# Patient Record
Sex: FEMALE
[De-identification: names, ages, dates, MRNs, and addresses within clinical notes are randomized per-mention and may not be internally consistent; named-entity substitution may affect disease eponyms.]

---

## 2020-03-04 PROBLEM — Z00.00 ENCOUNTER FOR PREVENTIVE HEALTH EXAMINATION: Status: ACTIVE | Noted: 2020-03-04

## 2020-04-08 ENCOUNTER — APPOINTMENT (OUTPATIENT)
Dept: OTOLARYNGOLOGY | Facility: CLINIC | Age: 70
End: 2020-04-08

## 2020-11-03 ENCOUNTER — APPOINTMENT (OUTPATIENT)
Dept: OTOLARYNGOLOGY | Facility: CLINIC | Age: 70
End: 2020-11-03
Payer: MEDICARE

## 2020-11-03 VITALS
HEART RATE: 80 BPM | DIASTOLIC BLOOD PRESSURE: 81 MMHG | TEMPERATURE: 97.8 F | SYSTOLIC BLOOD PRESSURE: 120 MMHG | HEIGHT: 68 IN | BODY MASS INDEX: 24.25 KG/M2 | WEIGHT: 160 LBS

## 2020-11-03 DIAGNOSIS — H90.3 SENSORINEURAL HEARING LOSS, BILATERAL: ICD-10-CM

## 2020-11-03 DIAGNOSIS — H61.23 IMPACTED CERUMEN, BILATERAL: ICD-10-CM

## 2020-11-03 DIAGNOSIS — H90.5 UNSPECIFIED SENSORINEURAL HEARING LOSS: ICD-10-CM

## 2020-11-03 DIAGNOSIS — Z78.9 OTHER SPECIFIED HEALTH STATUS: ICD-10-CM

## 2020-11-03 PROCEDURE — G0268 REMOVAL OF IMPACTED WAX MD: CPT

## 2020-11-03 PROCEDURE — 92557 COMPREHENSIVE HEARING TEST: CPT

## 2020-11-03 PROCEDURE — 99204 OFFICE O/P NEW MOD 45 MIN: CPT | Mod: 25

## 2020-11-03 PROCEDURE — 92567 TYMPANOMETRY: CPT

## 2020-11-03 NOTE — HISTORY OF PRESENT ILLNESS
[de-identified] : c/o clogged ears for past several mos.  Worse past 2 weeks.  No q tips.  No self treatment.  No prior ear problems.

## 2020-11-03 NOTE — ASSESSMENT
[FreeTextEntry1] : Patient with clogged ears.  Cerumen removed bilaterally but after cerumen removed patient noted to have asymmetric snhl - unknown duration.  Did not recommend steroid at this time since this is not sudden and patient was only complaining about right ear.  Recommended MRI however due to asymmetry.  R and A of steroid discussed.

## 2020-11-03 NOTE — PHYSICAL EXAM
[Midline] : trachea located in midline position [Normal] : no rashes [de-identified] : bilateral impacted cerumen  [de-identified] : after cerumen removal

## 2022-07-27 ENCOUNTER — OFFICE (OUTPATIENT)
Dept: URBAN - METROPOLITAN AREA CLINIC 12 | Facility: CLINIC | Age: 72
Setting detail: OPHTHALMOLOGY
End: 2022-07-27
Payer: MEDICARE

## 2022-07-27 DIAGNOSIS — H35.372: ICD-10-CM

## 2022-07-27 DIAGNOSIS — H35.3132: ICD-10-CM

## 2022-07-27 DIAGNOSIS — H25.13: ICD-10-CM

## 2022-07-27 PROCEDURE — 99204 OFFICE O/P NEW MOD 45 MIN: CPT | Performed by: OPHTHALMOLOGY

## 2022-07-27 PROCEDURE — 92250 FUNDUS PHOTOGRAPHY W/I&R: CPT | Performed by: OPHTHALMOLOGY

## 2022-07-27 ASSESSMENT — REFRACTION_AUTOREFRACTION
OS_AXIS: 090
OD_AXIS: 109
OS_CYLINDER: -0.75
OD_CYLINDER: -1.00
OD_SPHERE: -2.75
OS_SPHERE: -3.25

## 2022-07-27 ASSESSMENT — CONFRONTATIONAL VISUAL FIELD TEST (CVF)
OS_FINDINGS: FULL
OD_FINDINGS: FULL

## 2022-07-27 ASSESSMENT — SPHEQUIV_DERIVED
OD_SPHEQUIV: -3.25
OD_SPHEQUIV: -3.25
OS_SPHEQUIV: -3.625
OS_SPHEQUIV: -3.625

## 2022-07-27 ASSESSMENT — TONOMETRY
OD_IOP_MMHG: 11
OS_IOP_MMHG: 12

## 2022-07-27 ASSESSMENT — REFRACTION_CURRENTRX
OS_VPRISM_DIRECTION: PROGS
OS_OVR_VA: 20/
OD_CYLINDER: -0.75
OD_VPRISM_DIRECTION: PROGS
OS_ADD: +1.50
OS_ADD: +2.75
OS_VPRISM_DIRECTION: PROGS
OD_OVR_VA: 20/
OS_CYLINDER: -0.50
OS_CYLINDER: -0.50
OS_SPHERE: -4.00
OD_OVR_VA: 20/
OS_OVR_VA: 20/
OD_SPHERE: -3.50
OS_SPHERE: -3.75
OS_AXIS: 081
OD_AXIS: 094
OD_CYLINDER: -0.50
OS_AXIS: 084
OD_ADD: +2.75
OD_SPHERE: -3.50
OD_AXIS: 089
OD_VPRISM_DIRECTION: PROGS
OD_ADD: +1.50

## 2022-07-27 ASSESSMENT — REFRACTION_MANIFEST
OD_SPHERE: -2.75
OD_VA1: 20/30-2
OD_CYLINDER: -1.00
OS_SPHERE: -3.25
OS_AXIS: 090
OS_VA1: 20/40+2
OD_AXIS: 110
OU_VA: 20/30+2
OS_CYLINDER: -0.75

## 2022-07-27 ASSESSMENT — AXIALLENGTH_DERIVED
OD_AL: 24.4276
OS_AL: 24.5852
OD_AL: 24.4276
OS_AL: 24.5852

## 2022-07-27 ASSESSMENT — VISUAL ACUITY
OD_BCVA: 20/40-2
OS_BCVA: 20/40-1

## 2022-07-27 ASSESSMENT — KERATOMETRY
OD_K1POWER_DIOPTERS: 44.50
OS_AXISANGLE_DEGREES: 150
OD_K2POWER_DIOPTERS: 45.00
OS_K2POWER_DIOPTERS: 45.00
OS_K1POWER_DIOPTERS: 44.50
OD_AXISANGLE_DEGREES: 021

## 2024-05-14 ENCOUNTER — APPOINTMENT (OUTPATIENT)
Dept: OTOLARYNGOLOGY | Facility: CLINIC | Age: 74
End: 2024-05-14
Payer: MEDICARE

## 2024-05-14 VITALS
SYSTOLIC BLOOD PRESSURE: 139 MMHG | WEIGHT: 160 LBS | BODY MASS INDEX: 24.25 KG/M2 | HEART RATE: 76 BPM | DIASTOLIC BLOOD PRESSURE: 88 MMHG | HEIGHT: 68 IN

## 2024-05-14 DIAGNOSIS — H93.291 OTHER ABNORMAL AUDITORY PERCEPTIONS, RIGHT EAR: ICD-10-CM

## 2024-05-14 DIAGNOSIS — H61.21 IMPACTED CERUMEN, RIGHT EAR: ICD-10-CM

## 2024-05-14 PROCEDURE — 69210 REMOVE IMPACTED EAR WAX UNI: CPT | Mod: RT

## 2024-05-14 PROCEDURE — 99203 OFFICE O/P NEW LOW 30 MIN: CPT | Mod: 25

## 2024-05-14 RX ORDER — ACETIC ACID 20 MG/ML
2 SOLUTION AURICULAR (OTIC) 3 TIMES DAILY
Qty: 1 | Refills: 2 | Status: ACTIVE | COMMUNITY
Start: 2024-05-14 | End: 1900-01-01

## 2024-05-14 NOTE — REVIEW OF SYSTEMS
[Negative] : Heme/Lymph [Patient Intake Form Reviewed] : Patient intake form was reviewed [de-identified] : right ear blocked

## 2024-05-14 NOTE — CONSULT LETTER
[Consult Letter:] : I had the pleasure of evaluating your patient, [unfilled]. [Please see my note below.] : Please see my note below. [Consult Closing:] : Thank you very much for allowing me to participate in the care of this patient.  If you have any questions, please do not hesitate to contact me. [Sincerely,] : Sincerely, [FreeTextEntry1] : Dear Dr. STACEY MCCORD,  Thank you for your kind referral. Please refer to my enclosed office notes for [facility] . If there are any questions free to contact me. [FreeTextEntry3] : Sai Reyes MD, FACS

## 2024-05-14 NOTE — HISTORY OF PRESENT ILLNESS
[de-identified] : recent exam  and dx ear wax some ad discomfort usually hearing well neg pmh re ears